# Patient Record
Sex: MALE | Race: WHITE | Employment: FULL TIME | URBAN - METROPOLITAN AREA
[De-identification: names, ages, dates, MRNs, and addresses within clinical notes are randomized per-mention and may not be internally consistent; named-entity substitution may affect disease eponyms.]

---

## 2022-03-02 ENCOUNTER — OFFICE VISIT (OUTPATIENT)
Dept: DERMATOLOGY | Facility: CLINIC | Age: 62
End: 2022-03-02
Payer: COMMERCIAL

## 2022-03-02 DIAGNOSIS — L82.1 SEBORRHEIC KERATOSES: ICD-10-CM

## 2022-03-02 DIAGNOSIS — L57.0 ACTINIC KERATOSIS: ICD-10-CM

## 2022-03-02 DIAGNOSIS — D23.9 DERMATOFIBROMA: ICD-10-CM

## 2022-03-02 DIAGNOSIS — L91.8 SKIN TAG: ICD-10-CM

## 2022-03-02 DIAGNOSIS — L73.8 SEBACEOUS HYPERPLASIA: ICD-10-CM

## 2022-03-02 DIAGNOSIS — D18.01 CHERRY ANGIOMA: ICD-10-CM

## 2022-03-02 DIAGNOSIS — L81.4 SOLAR LENTIGO: Primary | ICD-10-CM

## 2022-03-02 DIAGNOSIS — D22.9 MULTIPLE BENIGN NEVI: ICD-10-CM

## 2022-03-02 PROCEDURE — 99203 OFFICE O/P NEW LOW 30 MIN: CPT | Mod: 25 | Performed by: PHYSICIAN ASSISTANT

## 2022-03-02 PROCEDURE — 11200 RMVL SKIN TAGS UP TO&INC 15: CPT | Performed by: PHYSICIAN ASSISTANT

## 2022-03-02 PROCEDURE — 17000 DESTRUCT PREMALG LESION: CPT | Mod: XS | Performed by: PHYSICIAN ASSISTANT

## 2022-03-02 RX ORDER — METOPROLOL TARTRATE 50 MG
TABLET ORAL
COMMUNITY
Start: 2021-03-04

## 2022-03-02 RX ORDER — GLIMEPIRIDE 2 MG/1
TABLET ORAL
COMMUNITY
Start: 2021-12-22

## 2022-03-02 RX ORDER — FENOFIBRATE 160 MG/1
160 TABLET ORAL
COMMUNITY
Start: 2021-09-15

## 2022-03-02 ASSESSMENT — PAIN SCALES - GENERAL: PAINLEVEL: NO PAIN (0)

## 2022-03-02 NOTE — PROGRESS NOTES
[facility] Dermatology Note  Encounter Date: Mar 2, 2022  Office Visit     Dermatology Problem List:  1. History of prostate and kidney cancer  2. Actinic Keratosis   - cryo 3/2/22    Family history: Negative for skin cancer.   Social history: Negative for skin cancer    Last skin check: 3/2/22  ____________________________________________    Assessment & Plan:    # Actinic keratosis - Left malar cheek x1.   - Discussed the pathogenesis of the condition.  - See cryo note.     # Skin tag- left neck   - See cryo note.     # Dermatofibroma - left medial thigh  - No further intervention needed.  -reassured benign    # Sebaceous hyperplasia - face  - Discussed the benign nature of this lesion.   - No further intervention needed.    # Cherry angioma(s).    - No further intervention needed.    # Multiple clinically benign nevi.    - No further intervention needed.   - Signs and Symptoms of non-melanoma skin cancer and ABCDEs of melanoma reviewed with patient. Patient encouraged to perform monthly self skin exams and educated on how to perform them. UV precautions reviewed with patient. Patient was asked about new or changing moles/lesions on body.   - Sunscreen: Apply 20 minutes prior to going outdoors and reapply every two hours, when wet or sweating. We recommend using an SPF 30 or higher, and to use one that is water resistant.     -recommend annual skin exams going forward.     # Seborrheic keratosis, non irritated.   - No further intervention needed.     # Solar lentigines.   - No further intervention needed.     Procedures Performed:   - Cryotherapy procedure note, location(s): left malar cheek and left neck After verbal consent and discussion of risks and benefits including, but not limited to, dyspigmentation/scar, blister, and pain, 1 AK and 1 skin tag was(were) treated with 1-2 mm freeze border for 1-2 cycles with liquid nitrogen. Post cryotherapy instructions were provided.    Follow-up: 1  year(s) in-person, or earlier for new or changing lesions    Staff and Scribe:     Scribe Disclosure:   I, Eduardo Escamilla, am serving as a scribe to document services personally performed by Arabella Luna PA-C, based on data collection and the provider's statements to me.  Provider Disclosure:   The documentation recorded by the scribe accurately reflects the services I personally performed and the decisions made by me.    All risks, benefits and alternatives were discussed with patient.  Patient is in agreement and understands the assessment and plan.  All questions were answered.    Arabella Luna PA-C, [facility] [place]  [facility]: Phone: [phone], Fax: [phone]  [facility]: Phone: [phone],  Fax: [phone]  [facility]: Phone: [phone], Fax: [phone]  ____________________________________________    CC: Skin Check (FBSC: concerns for spots on face.  No personal Hx of SC. History of prostate and kidney cancer. No known family Hx of SC. Has not seen derm. )    HPI:  Mr. Pierce Carbaajl is a(n) 61 year old male who presents today as a new patient for a skin check. Has spot of concern on the cheek which is pink and scaly. Patient also has three concerning lesions on the face. He notes they are not uniform and feel dry at times. He notes a lesion on the left neck that has been bothering him.     Patient is otherwise feeling well, without additional concerns.    Labs:  NA    Physical Exam:  Vitals: There were no vitals taken for this visit.  SKIN: Total skin excluding the undergarment areas was performed. The exam included the head/face, neck, both arms, chest, back, abdomen, both legs, digits and/or nails.   - Calderon Type ll  - There are dome shaped bright red papules on the trunk and extremities.  - Multiple regular brown pigmented macules and papules are identified on the trunk and  extremities   - Scattered brown macules on sun exposed areas.  - There are waxy stuck on tan to brown papules on the face, trunk and extremities.  - There is an erythematous macule with overyling adherent scale on the left malar cheek.   - There are yellow oily papules with central umbilication located on the face.  - There is skin colored pedunculated papules on the left neck. .   - There is a firm tan/flesh colored papule that dimples with lateral pressure on the right medial thigh.  - No other lesions of concern on areas examined.     Medications:  Current Outpatient Medications   Medication     acetaminophen (TYLENOL) 325 MG tablet     gabapentin (NEURONTIN) 300 MG capsule     Naproxen Sodium (ALEVE PO)     SIMVASTATIN PO     Tadalafil (CIALIS PO)     tamsulosin (FLOMAX) 0.4 MG 24 hr capsule     No current facility-administered medications for this visit.      Past Medical History:   Patient Active Problem List   Diagnosis     Left renal mass     Renal cancer, left (H)     Past Medical History:   Diagnosis Date     Renal disease      Sleep apnea     cpap

## 2022-03-02 NOTE — NURSING NOTE
Pierce Carbajal's chief complaint for this visit includes:  Chief Complaint   Patient presents with     Skin Check     FBSC: concerns for spots on face.  No personal Hx of SC. History of prostate and kidney cancer. No known family Hx of SC. Has not seen derm.      PCP: Meño Lin    Referring Provider:  Referred Self, MD  No address on file    There were no vitals taken for this visit.  No Pain (0)      No Known Allergies      Do you need any medication refills at today's visit? No    Keyanna Cornejo CMA

## 2022-03-02 NOTE — LETTER
3/2/2022         RE: Pierce Carbajal  [address]  [place] [address]        Dear Colleague,    Thank you for referring your patient, Pierce Carbajal, to the [facility]. Please see a copy of my visit note below.    [facility] Dermatology Note  Encounter Date: Mar 2, 2022  Office Visit     Dermatology Problem List:  1. History of prostate and kidney cancer  2. Actinic Keratosis   - cryo 3/2/22    Family history: Negative for skin cancer.   Social history: Negative for skin cancer    Last skin check: 3/2/22  ____________________________________________    Assessment & Plan:    # Actinic keratosis - Left malar cheek x1.   - Discussed the pathogenesis of the condition.  - See cryo note.     # Skin tag- left neck   - See cryo note.     # Dermatofibroma - left medial thigh  - No further intervention needed.  -reassured benign    # Sebaceous hyperplasia - face  - Discussed the benign nature of this lesion.   - No further intervention needed.    # Cherry angioma(s).    - No further intervention needed.    # Multiple clinically benign nevi.    - No further intervention needed.   - Signs and Symptoms of non-melanoma skin cancer and ABCDEs of melanoma reviewed with patient. Patient encouraged to perform monthly self skin exams and educated on how to perform them. UV precautions reviewed with patient. Patient was asked about new or changing moles/lesions on body.   - Sunscreen: Apply 20 minutes prior to going outdoors and reapply every two hours, when wet or sweating. We recommend using an SPF 30 or higher, and to use one that is water resistant.     -recommend annual skin exams going forward.     # Seborrheic keratosis, non irritated.   - No further intervention needed.     # Solar lentigines.   - No further intervention needed.     Procedures Performed:   - Cryotherapy procedure note, location(s): left malar cheek and left neck After verbal consent and discussion of  risks and benefits including, but not limited to, dyspigmentation/scar, blister, and pain, 1 AK and 1 skin tag was(were) treated with 1-2 mm freeze border for 1-2 cycles with liquid nitrogen. Post cryotherapy instructions were provided.    Follow-up: 1 year(s) in-person, or earlier for new or changing lesions    Staff and Scribe:     Scribe Disclosure:   MANDO, Eduardo Escamilla, am serving as a scribe to document services personally performed by Arabella Luna PA-C, based on data collection and the provider's statements to me.  Provider Disclosure:   The documentation recorded by the scribe accurately reflects the services I personally performed and the decisions made by me.    All risks, benefits and alternatives were discussed with patient.  Patient is in agreement and understands the assessment and plan.  All questions were answered.    Arabella Luna PA-C, [facility]  [facility]: Phone: [phone], Fax: [phone]  [facility]: Phone: [phone],  Fax: [phone]  [facility]: Phone: [phone], Fax: [phone]  ____________________________________________    CC: Skin Check (FBSC: concerns for spots on face.  No personal Hx of SC. History of prostate and kidney cancer. No known family Hx of SC. Has not seen derm. )    HPI:  Mr. Pierce Carbajal is a(n) 61 year old male who presents today as a new patient for a skin check. Has spot of concern on the cheek which is pink and scaly. Patient also has three concerning lesions on the face. He notes they are not uniform and feel dry at times. He notes a lesion on the left neck that has been bothering him.     Patient is otherwise feeling well, without additional concerns.    Labs:  NA    Physical Exam:  Vitals: There were no vitals taken for this visit.  SKIN: Total skin excluding the undergarment areas was performed. The exam included the head/face, neck, both  arms, chest, back, abdomen, both legs, digits and/or nails.   - Calderon Type ll  - There are dome shaped bright red papules on the trunk and extremities.  - Multiple regular brown pigmented macules and papules are identified on the trunk and extremities   - Scattered brown macules on sun exposed areas.  - There are waxy stuck on tan to brown papules on the face, trunk and extremities.  - There is an erythematous macule with overyling adherent scale on the left malar cheek.   - There are yellow oily papules with central umbilication located on the face.  - There is skin colored pedunculated papules on the left neck. .   - There is a firm tan/flesh colored papule that dimples with lateral pressure on the right medial thigh.  - No other lesions of concern on areas examined.     Medications:  Current Outpatient Medications   Medication     acetaminophen (TYLENOL) 325 MG tablet     gabapentin (NEURONTIN) 300 MG capsule     Naproxen Sodium (ALEVE PO)     SIMVASTATIN PO     Tadalafil (CIALIS PO)     tamsulosin (FLOMAX) 0.4 MG 24 hr capsule     No current facility-administered medications for this visit.      Past Medical History:   Patient Active Problem List   Diagnosis     Left renal mass     Renal cancer, left (H)     Past Medical History:   Diagnosis Date     Renal disease      Sleep apnea     cpap           Again, thank you for allowing me to participate in the care of your patient.        Sincerely,        Arabella Luna PA-C

## 2022-03-02 NOTE — PATIENT INSTRUCTIONS
[employer] or [employer]      Actinic Keratosis     Actinic keratosis is a small, rough, raised area on your skin. Often this area has been exposed to the sun for a long period of time.  Some actinic keratoses may develop into a type of skin cancer.  Causes  Actinic keratosis is caused by exposure to sunlight.   You are more likely to develop it if you:  Have fair skin, blue or green eyes, or blond or red hair   Had a kidney or other organ transplant   Take medicines that suppress the immune system   Spend a lot of time each day in the sun (for example, if you work outdoors)   Had many severe sunburns early in life   Are older   Symptoms  Actinic keratosis is usually found on the face, scalp, back of the hands, chest, or places that are often in the sun.   The skin changes begin as flat and scaly areas. They often have a white or yellow crusty scale on top.   The growths may be gray, pink, red, or the same color as your skin. Later they may become hard and wart-like or gritty and rough.   The affected areas may be easier to feel than see.  Exams and Tests  Your health care provider will look at your skin to diagnose this condition. A skin biopsy may be done to see if it is cancer.   Treatment  Some actinic keratoses become squamous cell skin cancer. Have your health care provider look at all skin growths as soon as you find them. Your provider will tell you how to treat them.   Growths may be removed by:  Burning (electrical cautery)   Scraping away the lesion and using electricity to kill any remaining cells (called curettage and electrodesiccation)   Cutting the tumor out and using stitches to place the skin back together (called excision)   Freezing (cryotherapy, which freezes and kills the cells)   If you have many of these skin growths, your doctor may recommend:   A treatment called photodynamic therapy   Chemical peels   Skin creams such as 5-fluorouracil (5-FU) and imiquimod   [place] (Prognosis)  A small number of  these skin growths turn into a type of skin cancer called squamous cell carcinoma.   When to Contact a Medical Professional  Call your health care provider if you see or feel a rough or scaly spot on your skin, or if you notice any other skin changes.  Prevention  The best way to lower your risk for actinic keratosis and skin cancer is to learn how to protect your skin from sun and ultraviolet (UV) light.  Things you can do to lower your exposure to sunlight include:  Wear clothing such as hats, long-sleeved shirts, long skirts, or pants.   Try to avoid being in the sun during midday, when ultraviolet light is most intense.   Use high-quality sunscreens, preferably with a sun protection factor (SPF) rating of at least 15. Pick a sunscreen that blocks both UVA and UVB light.   Apply sunscreen before going out into the sun, and reapply often.   Use sunscreen year-round, including in the winter.   Avoid sun lamps, tanning beds, and tanning salons.   Other things to know about sun exposure:  Sun exposure is stronger in or near surfaces that reflect light, such as water, sand, concrete, and areas painted white.   Sunlight is more intense at the beginning of the summer.   Skin burns faster at higher altitudes.   Alternative Names  Solar keratosis; Sun-induced skin changes - keratosis; Keratosis - actinic(solar)          Patient Education     Checking for Skin Cancer  You can find cancer early by checking your skin each month. There are 3 kinds of skin cancer. They are melanoma, basal cell carcinoma, and squamous cell carcinoma. Doing monthly skin checks is the best way to find new marks or skin changes. Follow the instructions below for checking your skin.   The ABCDEs of checking moles for melanoma   Check your moles or growths for signs of melanoma using ABCDE:     Asymmetry: the sides of the mole or growth don t match    Border: the edges are ragged, notched, or blurred    Color: the color within the mole or growth  varies    Diameter: the mole or growth is larger than 6 mm (size of a pencil eraser)    Evolving: the size, shape, or color of the mole or growth is changing (evolving is not shown in the images below)    Checking for other types of skin cancer  Basal cell carcinoma or squamous cell carcinoma have symptoms such as:       A spot or mole that looks different from all other marks on your skin    Changes in how an area feels, such as itching, tenderness, or pain    Changes in the skin's surface, such as oozing, bleeding, or scaliness    A sore that does not heal    New swelling or redness beyond the border of a mole    Who s at risk?  Anyone can get skin cancer. But you are at greater risk if you have:     Fair skin, light-colored hair, or light-colored eyes    Many moles or abnormal moles on your skin    A history of sunburns from sunlight or tanning beds    A family history of skin cancer    A history of exposure to radiation or chemicals    A weakened immune system  If you have had skin cancer in the past, you are at risk for recurring skin cancer.   How to check your skin  Do your monthly skin checkups in front of a full-length mirror. Check all parts of your body, including your:     Head (ears, face, neck, and scalp)    Torso (front, back, and sides)    Arms (tops, undersides, upper, and lower armpits)    Hands (palms, backs, and fingers, including under the nails)    Buttocks and genitals    Legs (front, back, and sides)    Feet (tops, soles, toes, including under the nails, and between toes)  If you have a lot of moles, take digital photos of them each month. Make sure to take photos both up close and from a distance. These can help you see if any moles change over time.   Most skin changes are not cancer. But if you see any changes in your skin, call your doctor right away. Only he or she can diagnose a problem. If you have skin cancer, seeing your doctor can be the first step toward getting the treatment that  could save your life.   [employer] last reviewed this educational content on 4/1/2019 2000-2020 [employer]. [address] [place] [address], [place] [address]. All rights reserved. This information is not intended as a substitute for professional medical care. Always follow your healthcare professional's instructions.       When should I call my doctor?    If you are worsening or not improving, please, contact us or seek urgent care as noted below.     Who should I call with questions (adults)?    [facility] (adult and pediatric): [phone]    [facility] (adult): [phone]    For urgent needs outside of business hours call the [facility] at [phone] and ask for the dermatology resident on call to be paged    If this is a medical emergency and you are unable to reach an ER, Call [phone]    Who should I call with questions (pediatric)?  [facility]- Pediatric Dermatology  Dr. Tiffanie Dillard, Dr. Aristeo Rizzo, Dr. Ale Harmon, MAKAYLA Maxwell, Dr. Yisel Carlton, Dr. Cherry Zurita & Dr. Charbel Shultz  Non-urgent nurse triage line; [phone]- Amina and Guillermina NICHOLS Care Coordinators   [place] (/Complex ) [phone]    If you need a prescription refill, please contact your pharmacy. Refills are approved or denied by our Physicians during normal business hours, Monday through Fridays  Per office policy, refills will not be granted if you have not been seen within the past year (or sooner depending on your child's condition)    Scheduling Information:  Pediatric Appointment Scheduling and Call Center [phone]  Radiology Scheduling- [phone]  Sedation Unit Scheduling- [phone]  [place] Scheduling- General [phone]; Pediatric Dermatology [phone]  Main  Services: [phone]  Yemeni: [phone]  Lao: [phone]  Hmong/Korean/Moldovan:  [phone]  Preadmission Nursing Department Fax Number: [phone] (Fax all pre-operative paperwork to this number)    For urgent matters arising during evenings, weekends, or holidays that cannot wait for normal business hours please call [phone] and ask for the dermatology resident on call to be paged.